# Patient Record
Sex: FEMALE | Race: WHITE | NOT HISPANIC OR LATINO | URBAN - NONMETROPOLITAN AREA
[De-identification: names, ages, dates, MRNs, and addresses within clinical notes are randomized per-mention and may not be internally consistent; named-entity substitution may affect disease eponyms.]

---

## 2024-07-24 ENCOUNTER — APPOINTMENT (OUTPATIENT)
Dept: CARDIOLOGY | Facility: CLINIC | Age: 74
End: 2024-07-24
Payer: COMMERCIAL

## 2024-07-24 VITALS
WEIGHT: 152.8 LBS | HEART RATE: 68 BPM | HEIGHT: 63 IN | DIASTOLIC BLOOD PRESSURE: 74 MMHG | BODY MASS INDEX: 27.07 KG/M2 | SYSTOLIC BLOOD PRESSURE: 116 MMHG

## 2024-07-24 DIAGNOSIS — E78.2 MIXED HYPERLIPIDEMIA: ICD-10-CM

## 2024-07-24 DIAGNOSIS — Z78.9 STATIN INTOLERANCE: ICD-10-CM

## 2024-07-24 DIAGNOSIS — I25.10 CORONARY ARTERIOSCLEROSIS AFTER PERCUTANEOUS TRANSLUMINAL CORONARY ANGIOPLASTY (PTCA): ICD-10-CM

## 2024-07-24 DIAGNOSIS — Z98.61 CORONARY ARTERIOSCLEROSIS AFTER PERCUTANEOUS TRANSLUMINAL CORONARY ANGIOPLASTY (PTCA): ICD-10-CM

## 2024-07-24 DIAGNOSIS — Z87.891 FORMER SMOKER: ICD-10-CM

## 2024-07-24 DIAGNOSIS — R00.2 PALPITATIONS: ICD-10-CM

## 2024-07-24 DIAGNOSIS — I10 ESSENTIAL HYPERTENSION: ICD-10-CM

## 2024-07-24 PROBLEM — R07.9 CHEST PAIN: Status: ACTIVE | Noted: 2023-04-05

## 2024-07-24 PROBLEM — R00.0 TACHYCARDIA: Status: ACTIVE | Noted: 2023-04-05

## 2024-07-24 PROCEDURE — 3074F SYST BP LT 130 MM HG: CPT | Performed by: INTERNAL MEDICINE

## 2024-07-24 PROCEDURE — 3078F DIAST BP <80 MM HG: CPT | Performed by: INTERNAL MEDICINE

## 2024-07-24 PROCEDURE — 99205 OFFICE O/P NEW HI 60 MIN: CPT | Performed by: INTERNAL MEDICINE

## 2024-07-24 PROCEDURE — 1036F TOBACCO NON-USER: CPT | Performed by: INTERNAL MEDICINE

## 2024-07-24 PROCEDURE — 1159F MED LIST DOCD IN RCRD: CPT | Performed by: INTERNAL MEDICINE

## 2024-07-24 PROCEDURE — 1160F RVW MEDS BY RX/DR IN RCRD: CPT | Performed by: INTERNAL MEDICINE

## 2024-07-24 PROCEDURE — 93000 ELECTROCARDIOGRAM COMPLETE: CPT | Performed by: INTERNAL MEDICINE

## 2024-07-24 RX ORDER — TIZANIDINE 2 MG/1
2 TABLET ORAL
COMMUNITY

## 2024-07-24 RX ORDER — METOPROLOL TARTRATE 50 MG/1
50 TABLET ORAL 2 TIMES DAILY
COMMUNITY
Start: 2024-04-29

## 2024-07-24 RX ORDER — VITAMIN E (DL,TOCOPHERYL ACET) 22.5 MG/ML
DROPS ORAL
COMMUNITY

## 2024-07-24 RX ORDER — CALCIUM ACETATE 667 MG/1
1334 CAPSULE ORAL EVERY OTHER DAY
COMMUNITY

## 2024-07-24 RX ORDER — NITROGLYCERIN 0.4 MG/1
0.4 TABLET SUBLINGUAL DAILY PRN
COMMUNITY
Start: 2023-09-12

## 2024-07-24 RX ORDER — ASPIRIN 81 MG/1
81 TABLET ORAL
COMMUNITY

## 2024-07-24 ASSESSMENT — ENCOUNTER SYMPTOMS: SHORTNESS OF BREATH: 1

## 2024-07-24 NOTE — PROGRESS NOTES
Cardiology Consultation- New Consult    Reason for referral:     HPI: Kathi Moura is a 74 y.o. female seen in consultation as a new patient at the request of herself to change cardiology follow-up in venues due to insurance reasons only.  She has no symptoms currently.  She denies any cardiovascular events, myocardial infarction, hospitalizations or nitrate usage over the past year.    She did have abnormal stress testing in 2021 that led to heart catheterization at [facility] in [facility], this then led to deployment of a single drug-eluting stent to the proximal through mid LAD with normal left ventricular function.  She remains on aspirin and metoprolol.  She is intolerant to statins due to side effects.    Her past medical history notable for hyperlipidemia, essential hypertension; previous smoker, quit 4 to 5 years ago.    There is no prior history of stroke, thromboembolic disorder no history of vascular disease or symptomatic claudication or TIAs.    Labs from August 2023 are reviewed including a normal hemogram, normal electrolytes, creatinine is 0.64, glucose 91, AST and ALT are 24 and 16 respectively, cholesterol 253, triglycerides 317, HDL 74 and .    ECG today is normal sinus rhythm and normal    Recommendations: Obtain treadmill stress test within the next 6 months, lipid panel, will treat lipids accordingly as tolerable in regards to her statin or fibrate intolerance, and follow-up in 1 year    Past Medical History:   She has no past medical history on file.    Surgical History:   She has a past surgical history that includes Coronary stent placement; Bladder surgery; Hysterectomy; Appendectomy; and Toe Surgery (Bilateral).    Family History:   Family History   Problem Relation Name Age of Onset    Breast cancer Mother      Stroke Mother      Liver disease Father      Diverticulitis Father      Ovarian cancer Sister         Social History:   Social History     Tobacco Use    Smoking  "status: Former     Types: Cigarettes    Smokeless tobacco: Never   Substance Use Topics    Alcohol use: Yes        Allergies:  Atorvastatin, Clopidogrel, Rosuvastatin, and Ketoconazole     Current Medications:    Current Outpatient Medications:     aspirin 81 mg EC tablet, Take 1 tablet (81 mg) by mouth once daily., Disp: , Rfl:     calcium acetate (Phoslo) 667 mg capsule, Take 2 capsules (1,334 mg) by mouth every other day., Disp: , Rfl:     metoprolol tartrate (Lopressor) 50 mg tablet, Take 1 tablet by mouth twice a day., Disp: , Rfl:     multivitamin with minerals (multivitamin) tablet, Take 1 tablet by mouth once daily., Disp: , Rfl:     nitroglycerin (Nitrostat) 0.4 mg SL tablet, Place 1 tablet (0.4 mg) under the tongue once daily as needed., Disp: , Rfl:     tiZANidine (Zanaflex) 2 mg tablet, Take 1 tablet (2 mg) by mouth once daily., Disp: , Rfl:     vitamin E 22.5 mg (50 unit)/mL drops oral solution, Take by mouth once daily., Disp: , Rfl:     vitamin-B complex split tablet, Take 1 tablet (2 half tablet) by mouth once daily., Disp: , Rfl:      Vitals:  Vitals:    07/24/24 0942 07/24/24 0943   BP: 118/72 116/74   BP Location: Left arm Right arm   Patient Position: Sitting Sitting   Pulse: 68    Weight: 69.3 kg (152 lb 12.8 oz)    Height: 1.6 m (5' 3\")     EKG done in office today        Review of Systems   Cardiovascular:  Positive for chest pain.   Respiratory:  Positive for shortness of breath.    All other systems reviewed and are negative.      Objective         Physical Exam  Constitutional:       Appearance: Normal appearance.   HENT:      Nose: Nose normal.   Neck:      Vascular: No carotid bruit.   Cardiovascular:      Rate and Rhythm: Normal rate.      Pulses: Normal pulses.      Heart sounds: Normal heart sounds.   Pulmonary:      Effort: Pulmonary effort is normal.   Abdominal:      General: Bowel sounds are normal.      Palpations: Abdomen is soft.   Musculoskeletal:         General: Normal range " of motion.      Cervical back: Normal range of motion.      Right lower leg: No edema.      Left lower leg: No edema.   Skin:     General: Skin is warm and dry.   Neurological:      General: No focal deficit present.      Mental Status: She is alert.   Psychiatric:         Mood and Affect: Mood normal.         Behavior: Behavior normal.         Thought Content: Thought content normal.         Judgment: Judgment normal.                Assessment and Plan:   1. Essential hypertension        2. Mixed hyperlipidemia        3. Coronary arteriosclerosis after percutaneous transluminal coronary angioplasty (PTCA)        4. Statin intolerance        5. BMI 27.0-27.9,adult        6. Former smoker        7. Palpitations               Scribe Attestation  By signing my name below, INeli LPN  , Scribe   attest that this documentation has been prepared under the direction and in the presence of London Espinoza DO.    Provider Attestation - Scribe documentation    All medical record entries made by the Scribe were at my direction and personally dictated by me. I have reviewed the chart and agree that the record accurately reflects my personal performance of the history, physical exam, discussion and plan.

## 2024-07-24 NOTE — LETTER
Health Maintenance Due   Topic Date Due   • Shingles Vaccine (1 of 2) 06/18/2002   • Lung Cancer Screening  06/18/2007   • Osteoporosis Screening  06/18/2017   • Medicare Wellness 65+  04/22/2020       Patient had influenza vaccine at [facility] 10/2019         July 24, 2024     Juanis Mcclain MD  [address] [facility] [address]    Patient: Kathi Moura   YOB: 1950   Date of Visit: 7/24/2024       Dear Dr. Juanis Mcclain MD:    Thank you for referring Kathi Moura to me for evaluation. Below are my notes for this consultation.  If you have questions, please do not hesitate to call me. I look forward to following your patient along with you.       Sincerely,     London Espinoza, DO      CC: No Recipients  ______________________________________________________________________________________    Cardiology Consultation- New Consult    Reason for referral:     HPI: Kathi Moura is a 74 y.o. female seen in consultation as a new patient at the request of herself to change cardiology follow-up in venues due to insurance reasons only.  She has no symptoms currently.  She denies any cardiovascular events, myocardial infarction, hospitalizations or nitrate usage over the past year.    She did have abnormal stress testing in 2021 that led to heart catheterization at [facility] in [facility], this then led to deployment of a single drug-eluting stent to the proximal through mid LAD with normal left ventricular function.  She remains on aspirin and metoprolol.  She is intolerant to statins due to side effects.    Her past medical history notable for hyperlipidemia, essential hypertension; previous smoker, quit 4 to 5 years ago.    There is no prior history of stroke, thromboembolic disorder no history of vascular disease or symptomatic claudication or TIAs.    Labs from August 2023 are reviewed including a normal hemogram, normal electrolytes, creatinine is 0.64, glucose 91, AST and ALT are 24 and 16 respectively, cholesterol 253, triglycerides 317, HDL 74 and .    ECG today is normal sinus rhythm and normal    Recommendations: Obtain treadmill stress test within the next 6 months, lipid panel, will treat lipids accordingly as  "tolerable in regards to her statin or fibrate intolerance, and follow-up in 1 year    Past Medical History:   She has no past medical history on file.    Surgical History:   She has a past surgical history that includes Coronary stent placement; Bladder surgery; Hysterectomy; Appendectomy; and Toe Surgery (Bilateral).    Family History:   Family History   Problem Relation Name Age of Onset   • Breast cancer Mother     • Stroke Mother     • Liver disease Father     • Diverticulitis Father     • Ovarian cancer Sister         Social History:   Social History     Tobacco Use   • Smoking status: Former     Types: Cigarettes   • Smokeless tobacco: Never   Substance Use Topics   • Alcohol use: Yes        Allergies:  Atorvastatin, Clopidogrel, Rosuvastatin, and Ketoconazole     Current Medications:    Current Outpatient Medications:   •  aspirin 81 mg EC tablet, Take 1 tablet (81 mg) by mouth once daily., Disp: , Rfl:   •  calcium acetate (Phoslo) 667 mg capsule, Take 2 capsules (1,334 mg) by mouth every other day., Disp: , Rfl:   •  metoprolol tartrate (Lopressor) 50 mg tablet, Take 1 tablet by mouth twice a day., Disp: , Rfl:   •  multivitamin with minerals (multivitamin) tablet, Take 1 tablet by mouth once daily., Disp: , Rfl:   •  nitroglycerin (Nitrostat) 0.4 mg SL tablet, Place 1 tablet (0.4 mg) under the tongue once daily as needed., Disp: , Rfl:   •  tiZANidine (Zanaflex) 2 mg tablet, Take 1 tablet (2 mg) by mouth once daily., Disp: , Rfl:   •  vitamin E 22.5 mg (50 unit)/mL drops oral solution, Take by mouth once daily., Disp: , Rfl:   •  vitamin-B complex split tablet, Take 1 tablet (2 half tablet) by mouth once daily., Disp: , Rfl:      Vitals:  Vitals:    07/24/24 0942 07/24/24 0943   BP: 118/72 116/74   BP Location: Left arm Right arm   Patient Position: Sitting Sitting   Pulse: 68    Weight: 69.3 kg (152 lb 12.8 oz)    Height: 1.6 m (5' 3\")     EKG done in office today        Review of Systems "   Cardiovascular:  Positive for chest pain.   Respiratory:  Positive for shortness of breath.    All other systems reviewed and are negative.      Objective        Physical Exam  Constitutional:       Appearance: Normal appearance.   HENT:      Nose: Nose normal.   Neck:      Vascular: No carotid bruit.   Cardiovascular:      Rate and Rhythm: Normal rate.      Pulses: Normal pulses.      Heart sounds: Normal heart sounds.   Pulmonary:      Effort: Pulmonary effort is normal.   Abdominal:      General: Bowel sounds are normal.      Palpations: Abdomen is soft.   Musculoskeletal:         General: Normal range of motion.      Cervical back: Normal range of motion.      Right lower leg: No edema.      Left lower leg: No edema.   Skin:     General: Skin is warm and dry.   Neurological:      General: No focal deficit present.      Mental Status: She is alert.   Psychiatric:         Mood and Affect: Mood normal.         Behavior: Behavior normal.         Thought Content: Thought content normal.         Judgment: Judgment normal.                Assessment and Plan:   1. Essential hypertension        2. Mixed hyperlipidemia        3. Coronary arteriosclerosis after percutaneous transluminal coronary angioplasty (PTCA)        4. Statin intolerance        5. BMI 27.0-27.9,adult        6. Former smoker        7. Palpitations               Scribe Attestation  By signing my name below, INeli LPN  , Scribe   attest that this documentation has been prepared under the direction and in the presence of London Espinoza DO.    Provider Attestation - Scribe documentation    All medical record entries made by the Scribe were at my direction and personally dictated by me. I have reviewed the chart and agree that the record accurately reflects my personal performance of the history, physical exam, discussion and plan.

## 2024-07-24 NOTE — PATIENT INSTRUCTIONS
Please bring all medicines, vitamins, and herbal supplements with you when you come to the office.    Prescriptions will not be filled unless you are compliant with your follow up appointments or have a follow up appointment scheduled as per instruction of your physician. Refills should be requested at the time of your visit.   BMI was above normal measurement. Current weight: 69.3 kg (152 lb 12.8 oz)  Weight change since last visit (-) denotes wt loss 152.8 lbs   Weight loss needed to achieve BMI 25: 12 Lbs  Weight loss needed to achieve BMI 30: -16.2 Lbs  Advised to Increase physical activity.

## 2024-08-21 ENCOUNTER — APPOINTMENT (OUTPATIENT)
Dept: CARDIOLOGY | Facility: CLINIC | Age: 74
End: 2024-08-21
Payer: MEDICARE

## 2024-10-28 ENCOUNTER — APPOINTMENT (OUTPATIENT)
Dept: CARDIOLOGY | Facility: CLINIC | Age: 74
End: 2024-10-28
Payer: COMMERCIAL

## 2024-12-04 ENCOUNTER — APPOINTMENT (OUTPATIENT)
Dept: CARDIOLOGY | Facility: CLINIC | Age: 74
End: 2024-12-04
Payer: COMMERCIAL

## 2025-01-08 ENCOUNTER — HOSPITAL ENCOUNTER (OUTPATIENT)
Dept: CARDIOLOGY | Facility: CLINIC | Age: 75
Discharge: HOME | End: 2025-01-08
Payer: MEDICARE

## 2025-01-08 VITALS — DIASTOLIC BLOOD PRESSURE: 78 MMHG | SYSTOLIC BLOOD PRESSURE: 142 MMHG | HEART RATE: 67 BPM

## 2025-01-08 DIAGNOSIS — R00.2 PALPITATIONS: ICD-10-CM

## 2025-01-08 DIAGNOSIS — Z98.61 CORONARY ARTERIOSCLEROSIS AFTER PERCUTANEOUS TRANSLUMINAL CORONARY ANGIOPLASTY (PTCA): ICD-10-CM

## 2025-01-08 DIAGNOSIS — I25.10 CORONARY ARTERIOSCLEROSIS AFTER PERCUTANEOUS TRANSLUMINAL CORONARY ANGIOPLASTY (PTCA): ICD-10-CM

## 2025-01-08 PROCEDURE — 93017 CV STRESS TEST TRACING ONLY: CPT

## 2025-01-08 PROCEDURE — 93018 CV STRESS TEST I&R ONLY: CPT | Performed by: INTERNAL MEDICINE

## 2025-01-08 PROCEDURE — 93016 CV STRESS TEST SUPVJ ONLY: CPT | Performed by: INTERNAL MEDICINE

## 2025-01-29 ENCOUNTER — TELEPHONE (OUTPATIENT)
Dept: CARDIOLOGY | Facility: CLINIC | Age: 75
End: 2025-01-29
Payer: MEDICARE

## 2025-01-29 NOTE — TELEPHONE ENCOUNTER
----- Message from London Espinoza sent at 1/28/2025  5:42 PM EST -----  No new orders. Please call patient with normal result.

## 2025-01-29 NOTE — TELEPHONE ENCOUNTER
Result Communication    Resulted Orders   Stress Test    Narrative                   [facility]  [address] [place] [address], [address], [place], [place] [address]          Tel [phone] Fax [phone]    Exercise Stress Test    Patient Name:      NATALIA HARDWICK      Ordering Provider:     [id number] EREN NIEVES  Study Date:        1/8/2025             Reading Physician:     [id number] Fior Bradshaw MD  MRN/PID:           [id number]             Supervising Physician: [id number] Haroon Myers MD, [facility]  Accession#:        [id number]         Fellow:  Date of Birth/Age: 1950 / 74 years Fellow:  Gender:            F                    Nurse:                 Aviva Mendez RN  Admission Status:                       Sonographer:           NA  Height:            160.02 cm            Technologist:  Weight:            68.95 kg             Additional Staff:  BSA:               1.72 m2              Encounter#:            [id number]  BMI:               26.93 kg/m2          Patient Location:    Study Type:    STRESS TEST ONLY  Diagnosis/ICD: Atherosclerotic heart disease-I25.10; Coronary angioplasty status                 (PTCA)-Z98.61; Palpitations-R00.2  Indication:    Arrythmia  CPT Codes:     Stress Test Interpretation-72619; Stress Test Supervision-16455    Falls Risk: Low: Patient has low risk for sustaining a fall; environmental safety interventions in place.     Study Details: Correct procedure and correct patient verified verbally.       Patient Performance: The patient exercised to stage on a Vic protocol for 2 minutes and 10 seconds, achieving 4.60 METS. The peak heart rate achieved was 106 bpm, which was 73 % of the age predicted target heart rate of 144 bpm. The resting blood pressure was 142/78 mmHg with a heart rate of  67 bpm. The standing blood pressure was 138/82 mmHg with a heart rate of 68 bpm. The patient's functional capacity was below average. The patient developed dyspnea during the stress exam. The symptoms resolved with rest. The blood pressure response was normal. The test was terminated due to: dyspnea. Mild sinus tachycardia.     Double Product (HR x BP): 165.       Baseline ECG: Resting ECG showed normal sinus rhythm. Normal sinus rhythm.     Stress Stage Data:  +-----------------+---+------+-------+                   HR Sys BPDias BP  +-----------------+---+------+-------+  Baseline Resting 67 142   78       +-----------------+---+------+-------+  Baseline [id number] 138   82       +-----------------+---+------+-------+  Stage I          [id number]   78       +-----------------+---+------+-------+       Recovery ECG: The heart rate recovery was normal.     +------------+---+------+-------+              HR Sys BPDias BP  +------------+---+------+-------+  Recovery I  [id number]   78       +------------+---+------+-------+  Recovery II 88 156   82       +------------+---+------+-------+  Recovery [id number] 142   86       +------------+---+------+-------+  Recovery IV 71 128   78       +------------+---+------+-------+       Summary:   1. Submaximal graded exercise stress test without diagnostic ST-T changes for ischemia.   2. No provoked chest pain or arrhythmia.   3. Poor cardiopulmonary conditioning.   4. Normal heart recovery phase.   5. Patient was able to exercise for only 2 minutes 10 seconds achieving 73% of maximum predicted heart rate and workload of 4.6 METS.    [id number] Fior Bradshaw MD  Electronically signed on 1/8/2025 at 4:48:37 PM                      ** Final **

## 2025-07-24 ENCOUNTER — APPOINTMENT (OUTPATIENT)
Dept: CARDIOLOGY | Facility: CLINIC | Age: 75
End: 2025-07-24
Payer: MEDICARE

## 2025-08-01 DIAGNOSIS — I10 ESSENTIAL HYPERTENSION: ICD-10-CM

## 2025-08-01 DIAGNOSIS — R00.2 PALPITATIONS: ICD-10-CM

## 2025-08-01 DIAGNOSIS — R00.0 TACHYCARDIA: ICD-10-CM

## 2025-08-04 RX ORDER — METOPROLOL TARTRATE 50 MG/1
50 TABLET ORAL 2 TIMES DAILY
Qty: 180 TABLET | Refills: 3 | Status: SHIPPED | OUTPATIENT
Start: 2025-08-04 | End: 2026-08-04

## 2025-09-02 ENCOUNTER — APPOINTMENT (OUTPATIENT)
Dept: CARDIOLOGY | Facility: CLINIC | Age: 75
End: 2025-09-02
Payer: MEDICARE

## 2025-10-07 ENCOUNTER — APPOINTMENT (OUTPATIENT)
Dept: CARDIOLOGY | Facility: CLINIC | Age: 75
End: 2025-10-07
Payer: MEDICARE